# Patient Record
(demographics unavailable — no encounter records)

---

## 2024-11-27 NOTE — PHYSICAL EXAM
[Appropriately responsive] : appropriately responsive [Alert] : alert [No Acute Distress] : no acute distress [No Lymphadenopathy] : no lymphadenopathy [Regular Rate Rhythm] : regular rate rhythm [No Murmurs] : no murmurs [Clear to Auscultation B/L] : clear to auscultation bilaterally [Soft] : soft [Non-tender] : non-tender [Non-distended] : non-distended [No HSM] : No HSM [No Lesions] : no lesions [No Mass] : no mass [Oriented x3] : oriented x3 [FreeTextEntry6] : L nipple surgically removed  [Examination Of The Breasts] : a normal appearance [No Masses] : no breast masses were palpable [Labia Majora] : normal [Labia Minora] : normal [No Bleeding] : There was no active vaginal bleeding [Normal] : normal [Uterine Adnexae] : normal [FreeTextEntry2] : herpetic appearing lesion on L labia majora at 3 o'clock. HSV swab taken today

## 2024-11-27 NOTE — HISTORY OF PRESENT ILLNESS
[Patient reported mammogram was normal] : Patient reported mammogram was normal [Patient reported PAP Smear was normal] : Patient reported PAP Smear was normal [FreeTextEntry1] : 35yo  LMP  is here for GYN intake, annual exam and to discuss labial bumps that appeared 2 months ago. No hx of HSV but did have warts in the past. Bumps were painful and lasted only a week. No new sexual partners.   Pt also desires BCM. She is concerned about weight related to hormones as she already struggles with weight and is currently semiglutide. She has been on patch, Nexplanon.  [Mammogramdate] : 2022 [PapSmeardate] : 2022

## 2024-11-27 NOTE — DISCUSSION/SUMMARY
[FreeTextEntry1] : 37yo  LMP 11/ desiring BCM but is concerned about weight gain, HSV appearing lesion and galactorrhea.   Galactorrhea:  Pt reports leakage from both breasts with and w/o stim and has had an elevated PRL in the past but has not seen Breast MD or had head imaging.  - PRL sent today  - b/l breast sono Rx given  - Given info to follow up with Dr Garcia.   HSV appearing lesion: - HSV swab sent today  - Precautions reviewed   BCM  - POP rx'd today  - We discussed LARC. Pt declined - VTE, DVT, PE, MI, CVA and elevated BP precautions reviewed.  - Pt to use condoms for STI prevention   RHM:  - Pap/HPV - Dentist, PCP, Derm as discussed  - Rx given for DEXA, mammo/sono - Offered HPV vacc today. Pt s/p completed series  - Offered STI screen today. Pt declined - Encouraged healthy diet, exercise, weight loss  Sandrita Pierson MD  Obstetrician/Gynecologist

## 2024-12-13 NOTE — HISTORY OF PRESENT ILLNESS
[FreeTextEntry1] : 37yo  LMP 11/ with AGC, favor neoplastic pap, hyperprolactinemia and HSV is here for follow up and colpo.  Pt has not had the repeat PRL yet. She says her PRL has been elevated for years (even before being on meds) but she has never had an MRI. She says she has a new sexual partner prior to being diagnosed with HSV and HPV but has not had STI screening. She desires screening today.

## 2024-12-13 NOTE — PROCEDURE
[Colposcopy] : Colposcopy  [Time out performed] : Pre-procedure time out performed.  Patient's name, date of birth and procedure confirmed. [Consent Obtained] : Consent obtained [Risks] : risks [Benefits] : benefits [Alternatives] : alternatives [Patient] : patient [Infection] : infection [Bleeding] : bleeding [Allergic Reaction] : allergic reaction [Abnormal Pap] : abnormal pap [HPV High Risk] : HPV high risk [No Premedication] : no premedication [Pap Performed] : pap not performed [SCI Fully Visualized] : SCI not fully visualized [ECC Performed] : ECC performed [No Abnormalities] : no abnormalities [Biopsy] : biopsy taken [Hemostasis Obtained] : Hemostasis obtained [Tolerated Well] : the patient tolerated the procedure well [de-identified] : 6 [de-identified] : only a very faint acetowhite change at 8 o'clock  [de-identified] : ECC, 12, 3,. 6, 8, 9   [de-identified] : Attempted an EMBx however could not tolerate, and I had trouble dilating her internal os.

## 2024-12-13 NOTE — DISCUSSION/SUMMARY
[FreeTextEntry1] : 37yo  LMP 11/4 with AGC, favor neoplastic pap, hyperprolactinemia and HSV s/p uncomplicated colpo and failed attempt at EMBx.   AGC, favor neoplastic pap:  - s/p colpo. Strict precautions and aftercare instructions reviewed. Aware she cannot have anything in vagina or be submerged in water until seen by me  - follow up on  for exam and results. Pt to call in AM and if results are back she is to keep appt. if not she is to reschedule  - Given info to see Onc regardless of results   hyperPRL:  - Pt given Rx previously to have repeat testing done  - Will likely need MRI as she has never had one   + HSV/New sexual partner:  - STI panel including BD affirm and GC sent today   Sandrita Pierson MD  Obstetrician/Gynecologist

## 2024-12-30 NOTE — HISTORY OF PRESENT ILLNESS
[Oral Contraceptive] : uses oral contraception pills [Y] : Positive pregnancy history [Menarche Age: ____] : age at menarche was [unfilled] [Currently Active] : currently active [FreeTextEntry1] : Yarelis is here for follow up on her cervical biopsies which were performed for the evaluation of atypical glandular cells on pap. She has positive HPV 18/45. The colposcopy was performed by Dr. Pierson.  [PapSmeardate] : 11/27/2024 [TextBox_31] : ATYPICAL [HIVDate] : 12/13/2024 [TextBox_53] : NEG [SyphilisDate] : 12/13/2024 [TextBox_58] : NEG [GonorrheaDate] : 12/13/2024 [TextBox_63] : NEG [ChlamydiaDate] : 12/13/2024 [TextBox_68] : NEG [HPVDate] : 11/27/2024 [TextBox_78] : POSITIVE [HepatitisBDate] : 12/13/2024 [TextBox_83] : NEG [HepatitisCDate] : 12/13/2024 [TextBox_88] : NEG [PGHxTotal] : 2 [[facility] [[facility] : 2

## 2024-12-30 NOTE — PHYSICAL EXAM
[Chaperone Present] : A chaperone was present in the examining room during all aspects of the physical examination [FreeTextEntry2] : Linn Watts MA [Appropriately responsive] : appropriately responsive [Alert] : alert [No Acute Distress] : no acute distress [Oriented x3] : oriented x3
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
## 2024-12-30 NOTE — HISTORY OF PRESENT ILLNESS
[Oral Contraceptive] : uses oral contraception pills [Y] : Positive pregnancy history [Menarche Age: ____] : age at menarche was [unfilled] [Currently Active] : currently active [FreeTextEntry1] : Yarelis is here for follow up on her cervical biopsies which were performed for the evaluation of atypical glandular cells on pap. She has positive HPV 18/45. The colposcopy was performed by Dr. Pierson.  [PapSmeardate] : 11/27/2024 [TextBox_31] : ATYPICAL [HIVDate] : 12/13/2024 [TextBox_53] : NEG [SyphilisDate] : 12/13/2024 [TextBox_58] : NEG [GonorrheaDate] : 12/13/2024 [TextBox_63] : NEG [ChlamydiaDate] : 12/13/2024 [TextBox_68] : NEG [HPVDate] : 11/27/2024 [TextBox_78] : POSITIVE [HepatitisBDate] : 12/13/2024 [TextBox_83] : NEG [HepatitisCDate] : 12/13/2024 [TextBox_88] : NEG [PGHxTotal] : 2 [[facility]] : 2 [[facility]] : 2

## 2025-01-21 NOTE — PHYSICAL EXAM
[Chaperone Present] : A chaperone was present in the examining room during all aspects of the physical examination [Normal] : No focal neurologic defects observed [FreeTextEntry2] : Kristen Kemp PA-C [de-identified] : Pelvic examination deferred

## 2025-01-21 NOTE — PLAN
[TextEntry] : EUA, D&IVA, Hysteroscopy @ [facility] Surgical consent form signed in the office  PST Testing TVUS/Pelvic US r/o ovarian abnormality - will call patient if results abnormal

## 2025-01-21 NOTE — CHIEF COMPLAINT
[FreeTextEntry1] : [place] Office  [facility] [phone] at [place] [phone]

## 2025-01-21 NOTE — END OF VISIT
[FreeTextEntry3] : I, Dr. Cookie Rodas, personally performed the evaluation and management (E/M) services for this new patient. That E/M includes conducting the initial examination, assessing all conditions, and establishing the plan of care. Today, Kristen Kemp PA-C, was here to observe my evaluation and management services for this patient to be followed going forward.

## 2025-01-21 NOTE — ASSESSMENT
[FreeTextEntry1] : 38 y/o female with atypical glandular cells, favor neoplastic 18/45 + PAP, and abnormal colposcopy with tissue biopsy presenting for further management. I discussed with the patient given abnormal tissue biopsy and atypical glandular cells, along with her history would recommend thorough examination to be performed while under anesthesia. Discussed the importance of sampling of the uterine lining and cervix as glandular cells can be found in uterus, cervix and fallopian tubes. I have ordered a baseline US for evaluation of ovaries.   I discussed at length with the patient the nature, purpose, risks, benefits, and alternatives to dilation with curettage and possible hysteroscopy.   She understands the risks to include (but not be limited to): uterine perforation with possible need for laparoscopy and/or laparotomy; infection with need for hospitalization; fluid overload with possible critical illness as a consequence; and bleeding with need for transfusion.  The patient agrees to proceed.

## 2025-01-21 NOTE — PLAN
[TextEntry] : EUA, D&IVA, Hysteroscopy @ [facility] Surgical consent form signed in the office  PST Testing TVUS/Pelvic US r/o ovarian abnormality - will call patient if results abnormal

## 2025-01-21 NOTE — ASSESSMENT
[FreeTextEntry1] : 36 y/o female with atypical glandular cells, favor neoplastic 18/45 + PAP, and abnormal colposcopy with tissue biopsy presenting for further management. I discussed with the patient given abnormal tissue biopsy and atypical glandular cells, along with her history would recommend thorough examination to be performed while under anesthesia. Discussed the importance of sampling of the uterine lining and cervix as glandular cells can be found in uterus, cervix and fallopian tubes. I have ordered a baseline US for evaluation of ovaries.   I discussed at length with the patient the nature, purpose, risks, benefits, and alternatives to dilation with curettage and possible hysteroscopy.   She understands the risks to include (but not be limited to): uterine perforation with possible need for laparoscopy and/or laparotomy; infection with need for hospitalization; fluid overload with possible critical illness as a consequence; and bleeding with need for transfusion.  The patient agrees to proceed.

## 2025-01-21 NOTE — CHIEF COMPLAINT
[FreeTextEntry1] : [place] Office  [facility] [phone] at [place] [phone]

## 2025-01-21 NOTE — PHYSICAL EXAM
[Chaperone Present] : A chaperone was present in the examining room during all aspects of the physical examination [Normal] : No focal neurologic defects observed [FreeTextEntry2] : Kristen Kemp PA-C [de-identified] : Pelvic examination deferred
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
## 2025-03-13 NOTE — ASSESSMENT
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 Given no nodularity on exam unlikely to be invasive, but could be microinvasive. Will get MRI and if no mass seen on cervix will recommend Robotic assisted laparoscopic hysterectomy, bilateral salpingectomy, sentinel lymph node dissection, cystoscopy. If there is a tumor will need exploratory laparotomy, modified radical hysterectomy, bilateral salpingectomy, cystoscopy. Patient agrees with plan. She is done with child bearing and understands recommendation for hysterectomy with adenocarcinoma in-situ or worse of cervix.

## 2025-03-13 NOTE — END OF VISIT
[FreeTextEntry3] : Telehealth visit in 2 weeks for MRI results and further management [FreeTextEntry2] :  MAYCOL Fernandes was present the entire duration of the patient interaction and gynecological exam.

## 2025-03-13 NOTE — DISCUSSION/SUMMARY
[Doing Well] : is doing well [Excellent Pain Control] : has excellent pain control [No Sign of Infection] : is showing no signs of infection [FreeTextEntry1] : Findings: Vagina without any visible lesions, normal cervix and mobile uterus 10 weeks. Uterus sounded to 10 cm. Endocervical polyp 0.5 cm noted on endocervix/lower uterine segment and glandular fluffy endometrium. Sampling performed with hysteroscopic resectoscope (Flex) and endocervical polypectomy performed hysteroscopically to ensure complete resection. No concerning findings. Normal bilateral fallopian tube ostia. Fluid deficit 390 cc.  Pathology:  A. Endocervical, curettage: *   At least endocervical adenocarcinoma in situ.  Invasion cannot be excluded.  B. Endometrium and endometrial polyp, curettage: *   Proliferative endometrium. *   Benign endocervical tissue.

## 2025-03-13 NOTE — REASON FOR VISIT
[Post Op] : post op visit [Other ___] : [unfilled] [de-identified] : 2/27/25 [de-identified] : Dilation and curettage, Hysteroscopic polypectomy [de-identified] : Pt is 2W0D post op. She is recovering well from her surgery. Pain well-controlled, tolerating regular diet, ambulating, voiding, normal BM. Denies vaginal bleeding.  Of note: Patient had a fasting prolactin level drawn on 02/21/2025. The result was 145.0.  She is scheduled for an MRI of the brain on 03/14/2025.

## 2025-03-13 NOTE — REASON FOR VISIT
[Post Op] : post op visit [Other ___] : [unfilled] [de-identified] : 2/27/25 [de-identified] : Dilation and curettage, Hysteroscopic polypectomy [de-identified] : Pt is 2W0D post op. She is recovering well from her surgery. Pain well-controlled, tolerating regular diet, ambulating, voiding, normal BM. Denies vaginal bleeding.  Of note: Patient had a fasting prolactin level drawn on 02/21/2025. The result was 145.0.  She is scheduled for an MRI of the brain on 03/14/2025.

## 2025-06-25 NOTE — ASSESSMENT
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 No adjuvant treatment recommended. Recovering well. Small area of separation of incision superficial. no concern for cellulitis. Recommend cleaning incisions with soap and water. Follow up in 4 weeks for cuff check. May return to work in 2 weeks with limited physical activity (weight lifting precautions).

## 2025-06-25 NOTE — REASON FOR VISIT
[Post Op] : post op visit [de-identified] : 6/12/25 [de-identified] : JORI PERSAUD BS, SLND, Uterosacral ligament fixation, cystoscopy at [facility]  [de-identified] : Patient has recovered well from her surgery, Denies any SOB, abnormal pain or VB. Bowel and bladder function are wnl. Patient states she feels well from a gynecological stand point.

## 2025-06-25 NOTE — REASON FOR VISIT
[Post Op] : post op visit [de-identified] : 6/12/25 [de-identified] : JORI PERSAUD BS, SLND, Uterosacral ligament fixation, cystoscopy at [facility]  [de-identified] : Patient has recovered well from her surgery, Denies any SOB, abnormal pain or VB. Bowel and bladder function are wnl. Patient states she feels well from a gynecological stand point.

## 2025-06-25 NOTE — DISCUSSION/SUMMARY
[Clean] : was clean [Dry] : was dry [Intact] : was intact [] : was  [Erythema] : was not erythematous [Ecchymosis] : was not ecchymotic [Seroma] : had no seroma [None] : had no drainage [Normal Skin] : normal appearance [Erythematous] : erythema [Firm] : soft [Tender] : nontender [Abnormal Bowel Sounds] : normal bowel sounds [Rebound] : no rebound tenderness [Guarding] : no guarding [Incisional Hernia] : no incisional hernia [Mass] : no palpable mass [Doing Well] : is doing well [Excellent Pain Control] : has excellent pain control [No Sign of Infection] : is showing no signs of infection [de-identified] : sligh surrounding erythema [FreeTextEntry1] : Pertinent findings revealed Normal external genitalia, normal cervix, uterus sounded to 10 cm, posterior fundal fibroid, normal bilateral fallopian tubes and ovaries. ICG dye mapping to bilateral obturator sentinel lymph nodes. Normal upper abdomen or pelvis. Cystoscopy, normal bilateral ureteral jets. No injury to the bladder,  no sutures, No lessions or masses.

## 2025-06-25 NOTE — END OF VISIT
[FreeTextEntry3] : RTC in 4 weeks for cuff check [FreeTextEntry2] : Anneliese Love MA was present the entire duration of the patient interaction and gynecological exam.

## 2025-06-25 NOTE — DISCUSSION/SUMMARY
[Clean] : was clean [Dry] : was dry [Intact] : was intact [] : was  [Erythema] : was not erythematous [Ecchymosis] : was not ecchymotic [Seroma] : had no seroma [None] : had no drainage [Normal Skin] : normal appearance [Erythematous] : erythema [Firm] : soft [Tender] : nontender [Abnormal Bowel Sounds] : normal bowel sounds [Rebound] : no rebound tenderness [Guarding] : no guarding [Incisional Hernia] : no incisional hernia [Mass] : no palpable mass [Doing Well] : is doing well [Excellent Pain Control] : has excellent pain control [No Sign of Infection] : is showing no signs of infection [de-identified] : sligh surrounding erythema [FreeTextEntry1] : Pertinent findings revealed Normal external genitalia, normal cervix, uterus sounded to 10 cm, posterior fundal fibroid, normal bilateral fallopian tubes and ovaries. ICG dye mapping to bilateral obturator sentinel lymph nodes. Normal upper abdomen or pelvis. Cystoscopy, normal bilateral ureteral jets. No injury to the bladder,  no sutures, No lessions or masses.

## 2025-07-24 NOTE — ASSESSMENT
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 No adjuvant treatment recommended. Recovered well. Small area of separation resolved. Vaginal cuff well healed.

## 2025-07-24 NOTE — ASSESSMENT
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 No adjuvant treatment recommended. Recovered well. Small area of separation resolved. Vaginal cuff well healed.

## 2025-07-24 NOTE — DISCUSSION/SUMMARY
[Clean] : was clean [Dry] : was dry [Intact] : was intact [None] : had no drainage [Normal Skin] : normal appearance [Erythematous] : erythema [Doing Well] : is doing well [Excellent Pain Control] : has excellent pain control [No Sign of Infection] : is showing no signs of infection [Erythema] : was not erythematous [Ecchymosis] : was not ecchymotic [Seroma] : had no seroma [Firm] : soft [Tender] : nontender [Abnormal Bowel Sounds] : normal bowel sounds [Rebound] : no rebound tenderness [Guarding] : no guarding [Incisional Hernia] : no incisional hernia [Mass] : no palpable mass [External Genitalia Abnormal] : normal external genitalia [Vaginal Exam Abnormal] : normal vaginal exam [de-identified] : cuff well healed [FreeTextEntry1] : Pertinent findings revealed Normal external genitalia, normal cervix, uterus sounded to 10 cm, posterior fundal fibroid, normal bilateral fallopian tubes and ovaries. ICG dye mapping to bilateral obturator sentinel lymph nodes. Normal upper abdomen or pelvis. Cystoscopy, normal bilateral ureteral jets. No injury to the bladder,  no sutures, No lessions or masses.

## 2025-07-24 NOTE — END OF VISIT
[FreeTextEntry3] : Surveillance in 6 months with pelvic exam [FreeTextEntry2] : Anneliese Love MA was present the entire duration of the patient interaction and gynecological exam.

## 2025-07-24 NOTE — REASON FOR VISIT
[Post Op] : post op visit [Other ___] : [unfilled] [de-identified] : 6/12/25 [de-identified] : JORI PERSAUD BS, SLND, Uterosacral ligament fixation, cystoscopy at [facility]  [de-identified] : Patient has recovered well from her surgery, Denies any SOB, abnormal pain or VB. Bowel and bladder function are wnl. Patient states she feels well from a gynecological stand point.

## 2025-07-24 NOTE — DISCUSSION/SUMMARY
[Clean] : was clean [Dry] : was dry [Intact] : was intact [None] : had no drainage [Normal Skin] : normal appearance [Erythematous] : erythema [Doing Well] : is doing well [Excellent Pain Control] : has excellent pain control [No Sign of Infection] : is showing no signs of infection [Erythema] : was not erythematous [Ecchymosis] : was not ecchymotic [Seroma] : had no seroma [Firm] : soft [Tender] : nontender [Abnormal Bowel Sounds] : normal bowel sounds [Rebound] : no rebound tenderness [Guarding] : no guarding [Incisional Hernia] : no incisional hernia [Mass] : no palpable mass [External Genitalia Abnormal] : normal external genitalia [Vaginal Exam Abnormal] : normal vaginal exam [de-identified] : cuff well healed [FreeTextEntry1] : Pertinent findings revealed Normal external genitalia, normal cervix, uterus sounded to 10 cm, posterior fundal fibroid, normal bilateral fallopian tubes and ovaries. ICG dye mapping to bilateral obturator sentinel lymph nodes. Normal upper abdomen or pelvis. Cystoscopy, normal bilateral ureteral jets. No injury to the bladder,  no sutures, No lessions or masses.

## 2025-07-24 NOTE — REASON FOR VISIT
[Post Op] : post op visit [Other ___] : [unfilled] [de-identified] : 6/12/25 [de-identified] : JORI PERSAUD BS, SLND, Uterosacral ligament fixation, cystoscopy at [facility]  [de-identified] : Patient has recovered well from her surgery, Denies any SOB, abnormal pain or VB. Bowel and bladder function are wnl. Patient states she feels well from a gynecological stand point.